# Patient Record
Sex: FEMALE | Race: WHITE
[De-identification: names, ages, dates, MRNs, and addresses within clinical notes are randomized per-mention and may not be internally consistent; named-entity substitution may affect disease eponyms.]

---

## 2019-07-26 ENCOUNTER — HOSPITAL ENCOUNTER (EMERGENCY)
Dept: HOSPITAL 25 - UCEAST | Age: 19
Discharge: HOME | End: 2019-07-26
Payer: COMMERCIAL

## 2019-07-26 VITALS — DIASTOLIC BLOOD PRESSURE: 89 MMHG | SYSTOLIC BLOOD PRESSURE: 135 MMHG

## 2019-07-26 DIAGNOSIS — H01.004: Primary | ICD-10-CM

## 2019-07-26 PROCEDURE — 99211 OFF/OP EST MAY X REQ PHY/QHP: CPT

## 2019-07-26 PROCEDURE — G0463 HOSPITAL OUTPT CLINIC VISIT: HCPCS

## 2019-07-26 NOTE — UC
Eye Complaint HPI





- HPI Summary


HPI Summary: 





18-year-old female who presents with left eyelid swelling, red eye, and 

discharge from the eye for 2 days.  Patient states that she woke up and noticed 

swelling, crusting and has had some redness in the eye along with some swelling 

of her eyelid.  No visual complaints, or pain with extraocular movements.  

Patient is contacts.  No fevers or chills, no URI like symptoms or congestion.





- History of Current Complaint


Chief Complaint: UCEye


Stated Complaint: EYE IRRITATION


Time Seen by Provider: 07/26/19 11:04


Hx Last Menstrual Period: 2/17/19


Pain Intensity: 7





- Allergies/Home Medications


Allergies/Adverse Reactions: 


 Allergies











Allergy/AdvReac Type Severity Reaction Status Date / Time


 


No Known Allergies Allergy   Verified 07/26/19 11:18











Home Medications: 


 Home Medications





NK [No Home Medications Reported]  07/26/19 [History Confirmed 07/26/19]











PMH/Surg Hx/FS Hx/Imm Hx


Previously Healthy: Yes





- Surgical History


Surgical History: None





- Family History


Known Family History: 


   Negative: Cardiac Disease, Hypertension





- Social History


Alcohol Use: Occasionally


Alcohol Amount: w/e


Substance Use Type: None


Smoking Status (MU): Never Smoked Tobacco





Review of Systems


All Other Systems Reviewed And Are Negative: Yes


Eyes: Positive: Drainage, Eye Redness.  Negative: Blurred Vision, Diplopia





Physical Exam





- Summary


Physical Exam Summary: 





Constitutional: Well-developed, Well-nourished, Alert. (-) Distressed


Skin: Warm, Dry


HENT: Normocephalic; Atraumatic


Eyes: PERRL, EOMI w/o pain, mild edema and erythema of upper L eyelid, crusting 

of L eyelashes


Neck: Musculoskeletal ROM normal neck. 


Cardio: Rhythm regular, rate normal, Heart sounds normal; Intact distal pulses


Pulmonary/Chest wall: Effort normal. (-) Respiratory distress


Abd: non distended 


Musculoskeletal: (-) Edema


Lymph: (-) Cervical adenopathy


Neuro: Alert, Oriented x3


Psych: Mood and affect Normal





Vital Signs: 


 Initial Vital Signs











Temp  37.3 C   07/26/19 11:15


 


Pulse  100   07/26/19 11:15


 


Resp  18   07/26/19 11:15


 


BP  135/89   07/26/19 11:15


 


Pulse Ox  100   07/26/19 11:15














Eye Complaint Course/Dx





- Course


Course Of Treatment: 





18-year-old female who presents with left eyelid swelling, red eye, and 

discharge from the eye for 2 days.


Differential includes conjunctivitis, blepharitis, periorbital or orbital 

cellulitis.


Patient is well-appearing, no pain extraocular movements and minimal swelling 

on exam.  Exam is likely consistent with blepharitis.  Patient will try warm 

compresses, topical bacitracin eyelid at night, and artificial tears.  Patient 

given strict return to questions if the swelling is to get worse, if pain, or 

fevers as this would suggest a more significant process such as periorbital or 

orbital cellulitis.





- Differential Dx/Diagnosis


Differential Diagnosis/HQI/PQRI: Conjunctivitis, Periorbital Cellulitis, 

Orbital Cellulitis, Other - blepharitis


Provider Diagnosis: 


 Blepharitis








Discharge





- Sign-Out/Discharge


Documenting (check all that apply): Patient Departure


All imaging exams completed and their final reports reviewed: No Studies





- Discharge Plan


Condition: Stable


Disposition: HOME


Referrals: 


[facility] [Outside] (Please call your primary care doctor 

and let them know you were here and how you are doing. )


Additional Instructions: 


You were seen at urgent care for left eyelid swelling and a red eye.  Your exam 

is consistent with blepharitis and inflammation of the eyelid.  Please use 

artificial tears (1-2 drops as needed for dry eye). You can apply bacitracin to 

your eyelid at night once per day for one week.  Also use warm compresses on 

your eye.  If you have worsening of your symptoms, increased eye pain, visual 

problems, fevers or are concerned please seek medical attention.





- Billing Disposition and Condition


Condition: STABLE


Disposition: Home